# Patient Record
Sex: FEMALE | Race: WHITE
[De-identification: names, ages, dates, MRNs, and addresses within clinical notes are randomized per-mention and may not be internally consistent; named-entity substitution may affect disease eponyms.]

---

## 2021-10-07 ENCOUNTER — HOSPITAL ENCOUNTER (EMERGENCY)
Dept: HOSPITAL 47 - EC | Age: 36
Discharge: HOME | End: 2021-10-07
Payer: SELF-PAY

## 2021-10-07 VITALS
SYSTOLIC BLOOD PRESSURE: 134 MMHG | DIASTOLIC BLOOD PRESSURE: 80 MMHG | TEMPERATURE: 98.5 F | HEART RATE: 100 BPM | RESPIRATION RATE: 20 BRPM

## 2021-10-07 DIAGNOSIS — F10.129: ICD-10-CM
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
## 2021-10-07 DIAGNOSIS — Z02.89: Primary | ICD-10-CM

## 2021-10-07 PROCEDURE — 99282 EMERGENCY DEPT VISIT SF MDM: CPT

## 2021-10-07 NOTE — ED
Alcohol HPI





- General


Chief Complaint: Alcohol


Stated Complaint: OWI


Time Seen by Provider: 10/07/21 19:39


Source: patient, police, RN notes reviewed


Mode of arrival: ambulatory


Limitations: no limitations





- History of Present Illness


Initial Comments: 





Patient is a 36-year-old female presenting to the emergency department in police

custody for medical clearance.  Patient was a rested for an OWI, she blew a 0.36

at the police department and they brought her in for medical clearance.  She is 

in no acute distress, she is cooperating.  She denies any pain anywhere.  She 

states she's only had a few small tiny bottles of liquor today.  She does drink 

daily.  She denies any pain, no chest pain or shortness of breath, no nausea or 

vomiting.  She denies any other drug use.  She denies being pregnant.  Denies 

any suicidal or homicidal thoughts.  She has no complaints today.  Her vital 

signs are stable upon arrival.





- Related Data


                                    Allergies











Allergy/AdvReac Type Severity Reaction Status Date / Time


 


No Known Allergies Allergy   Verified 10/07/21 18:50














Review of Systems


ROS Statement: 


Those systems with pertinent positive or pertinent negative responses have been 

documented in the HPI.





ROS Other: All systems not noted in ROS Statement are negative.





Past Medical History


Past Medical History: No Reported History


Past Surgical History: No Surgical Hx Reported


Smoking Status: Never smoker


Past Alcohol Use History: Daily


Past Drug Use History: None Reported





General Exam





- General Exam Comments


Initial Comments: 





GENERAL: 


Patient is well-developed and well-nourished.  Patient is nontoxic and in no 

acute distress, does appear intoxicated.





HEAD: 


Atraumatic, normocephalic.





EYES:


Pupils equal round and reactive to light, extraocular movements intact, sclera 

anicteric, conjunctiva are normal.  Eyelids were unremarkable.





ENT: 


Nares patent, oropharynx clear without exudates.  Moist mucous membranes.





NECK: 


Normal range of motion, supple without lymphadenopathy or JVD.





LUNGS:


Unlabored respirations.  Breath sounds clear to auscultation bilaterally and 

equal.  No wheezes rales or rhonchi.





HEART:


Regular rate and rhythm without murmurs, rubs or gallops.





ABDOMEN: 


Soft, nontender, normoactive bowel sounds.  No guarding, no rebound.  No masses 

appreciated.





MUSCULOSKELETAL: 


Normal extremities with adequate strength and normal range of motion, no pitting

or edema.  No clubbing or cyanosis.





NEUROLOGICAL: 


Patient is alert and oriented x 3.   Normal speech, normal gait.   





SKIN:


 Warm, Dry, normal turgor, no rashes or lesions noted.


Limitations: no limitations





Course





                                   Vital Signs











  10/07/21 10/07/21





  18:47 19:53


 


Temperature 98.4 F 98.5 F


 


Pulse Rate 101 H 100


 


Respiratory 18 20





Rate  


 


Blood Pressure  134/80


 


O2 Sat by Pulse 97 95





Oximetry  














Medical Decision Making





- Medical Decision Making





Patient is a 36-year-old female here in police custody for OWI, she blew a 0.36 

the police department, came in for medical clearance.  Patient is in no acute 

distress, exam is unremarkable, she has no complaints.  Her vital signs are 

stable.  We did a BAT, its 0.303.  Patient is medically cleared for alf.  The 

 is also in agreement with this.  She is stable for discharge and 

will be discharged please custody.  Case discussed with Dr. Leger.





Disposition


Clinical Impression: 


 Medical clearance for incarceration, Alcoholic intoxication





Disposition: HOME SELF-CARE


Condition: Stable


Instructions (If sedation given, give patient instructions):  Alcohol 

Intoxication (ED)


Additional Instructions: 


Please return to the Emergency Department if symptoms worsen or any other 

concerns.


Patient is medically clear for alf.


Is patient prescribed a controlled substance at d/c from ED?: No


Referrals: 


None,Stated [Primary Care Provider] - 1-2 days


Time of Disposition: 19:59